# Patient Record
Sex: FEMALE | Race: WHITE
[De-identification: names, ages, dates, MRNs, and addresses within clinical notes are randomized per-mention and may not be internally consistent; named-entity substitution may affect disease eponyms.]

---

## 2021-02-10 ENCOUNTER — HOSPITAL ENCOUNTER (EMERGENCY)
Dept: HOSPITAL 56 - MW.ED | Age: 31
Discharge: HOME | End: 2021-02-10
Payer: SELF-PAY

## 2021-02-10 DIAGNOSIS — T83.84XA: Primary | ICD-10-CM

## 2021-02-10 NOTE — EDM.PDOC
ED HPI GENERAL MEDICAL PROBLEM





- General


Chief Complaint: OB/GYN Problem


Stated Complaint: IUD PAIN


Time Seen by Provider: 02/10/21 09:13





- History of Present Illness


INITIAL COMMENTS - FREE TEXT/NARRATIVE: 





CHIEF COMPLAINT(S): IUD pain








HISTORY OF PRESENT ILLNESS: This is a 30-year-old woman without any significant 

past medical history who comes to the emergency department with a chief 

complaint of IUD pain.  The patient states that she just moved from [place] and

for the last 2 weeks she has been experiencing pain from her IUD.  She states 

that she got the Mirena IUD placed 2 years ago.  She describes the pain as 

crampy and located in her groin area.  She denies any vaginal bleeding, vaginal 

discharge, back pain, dysuria, hematuria.  She denies any nausea, vomiting or 

injury.  She states that she is sexually active.  She states that she is not 

concerned about an STD.  She states that she did take Tylenol yesterday which 

did not relieve her pain.  She denies any aggravating symptoms of her pain.  She

describes her pain as 10 out of 10.  She denies any radiation of this pain.  She

states that she saw Dr. Porter (gyn) 1 week ago and is in the process of 

switching since she moved from [place].  She states that she came to the 

emergency department because she called the clinic and they told her that it 

would cost her $100 to have it removed.





 


REVIEW OF SYSTEMS: 





Constitutional: Denies fever, chills.


Eyes: Denies eye pain


Ears, Nose, Mouth, & Throat: Denies earache


Cardiovascular: Denies chest pain


Respiratory: Denies shortness of breath


Gastrointestinal: Denies abdominal pain, nausea, vomiting, diarrhea, 

hematochezia.


Genitourinary: Positive for IUD pain.  Denies hematuria, dysuria, vaginal 

bleeding, vaginal discharge, flank pain


Skin:Denies a rash


MSK: Denies joint pain


Neurological: Denies blurred vision numbness, tingling, weakness


Psychiatric: Denies depression








PAST MEDICAL HISTORY: As per history of present illness and as reviewed below 

otherwise noncontributory.





SURGICAL HISTORY: As per history of present illness and as reviewed below 

otherwise noncontributory.





LMP: Has not had periods since on Mirena





SOCIAL HISTORY: As per history of present illness and as reviewed below otherwi

se noncontributory.





FAMILY HISTORY: As per history of present illness and as reviewed below 

otherwise noncontributory.








EXAMINATION OF ORGAN SYSTEMS/BODY AREAS: 





Constitutional: Blood pressure was 141/93, heart rate 84, respiratory rate 15 

with an oxygen saturation 97% on room air.  Temperature 36.6


General: Overall well-appearing woman who is in no acute distress


Psychiatric: Appropriate mood and affect.


Eyes: No scleral icterus or conjunctival erythema


ENMT: Moist mucous membranes. No pharyngeal erythema


Cardiovascular: Regular, rate, and rhythm.  No gallops, murmurs, or rubs.


Respiratory: Lungs clear to auscultation bilaterally.  No wheezes, rales, or 

rhonchi.


Gastrointestinal: Soft, non-tender, non-distended.  Normoactive bowel sounds no 

rebound or guarding


Genitourinary: Mild suprapubic tenderness.  No CVA tenderness.  No obvious blood

on the patient's underwear.


Musculoskeletal: Normal range of motion.


Skin: No lesions or abrasions.


Neurological:     Alert, GCS 15








MEDICAL DECISION MAKING AND COURSE IN THE ED WITH INTERPRETATION/REVIEW OF 

DIAGNOSTIC STUDIES: This is a 30-year-old and without any significant past 

medical history who comes to the emergency department with IUD pain who has no 

other concerning symptoms such as vaginal bleeding, vaginal discharge, fever, 

chills, back pain, dysuria or hematuria.  Patient's vitals are normal.  I did 

have a discussion with the patient that typically IUD should be removed in the 

clinic so they can be monitored for post removal complications and is better 

done by a gynecologist.  I discussed with her that I would contact gynecology to

see if they could come and evaluate her in the emergency department versus 

having it removed in the clinic.  I did offer the patient Toradol IM.  She was 

amenable to this plan.  I did offer to send a urine sample for possible urinary 

tract infection however she states that she has had urinary tract infections in 

the past and this feels different.  Therefore we will not be sending a uri

nalysis today.





I contacted Dr. Porter who came down and had a discussion with the patient.  

During the discussion we discussed that we could treat her pain and that as soon

as her insurance kicks in she could have the IUD removed in the clinic setting 

where they have the appropriate tools and monitoring for complications as we do 

not typically remove IUDs in the emergency department.  At this time given that 

there is no other symptoms besides pain and she has normal vital signs and 

emergent IUD removal is not indicated at this time.  We did discuss with her 

that we could have her follow-up with [facility] where 

they could possibly provide her with a discounted or free IUD removal.





We again offered the patient Toradol for pain relief and we did discuss use of 

over-the-counter medications such as Tylenol Motrin.  She refused the 

intramuscular Toradol and just wanted the number and wanted to leave.  Therefore

I did provide the patient with a prescription for Tylenol and Motrin, contact 

information for obstetrics and gynecology and the contact number for the [facility].  She is to return for any new or worsening 

symptoms such as vaginal bleeding, fever, vaginal discharge.  She was amenable 

to discharge and had no further questions





DISPOSITION: The patient was discharged home in stable condition. The patient 

will follow up with obstetrics, gynecology, or [facility]





CONDITION: Fair





PROCEDURES: None





FINAL IMPRESSION(S)/DIAGNOSES: 





1.  Acute pain likely secondary to IUD





 





Fortunato Hsieh M.D.


  ** Pelvic


Pain Score (Numeric/FACES): 8





- Related Data


                                    Allergies











Allergy/AdvReac Type Severity Reaction Status Date / Time


 


No Known Allergies Allergy   Verified 02/10/21 08:57











Home Meds: 


                                    Home Meds





Acetaminophen [Tylenol Extra Strength] 1,000 mg PO Q6HR #28 tablet 02/10/21 [Rx]


Ibuprofen [Ibu] 400 mg PO Q6HR #28 tablet 02/10/21 [Rx]











Past Medical History





- Infectious Disease History


Infectious Disease History: Reports: None





Social & Family History





- Family History


Family Medical History: No Pertinent Family History





- Tobacco Use


Tobacco Use Status *Q: Never Tobacco User





- Caffeine Use


Caffeine Use: Reports: None





- Recreational Drug Use


Recreational Drug Use: No





ED ROS GENERAL





- Review of Systems


Review Of Systems: See Below





ED EXAM, RENAL/





- Physical Exam


Exam: See Below





Course





- Vital Signs


Last Recorded V/S: 


                                Last Vital Signs











Temp  36.6 C   02/10/21 08:58


 


Pulse  84   02/10/21 08:58


 


Resp  15   02/10/21 08:58


 


BP  141/93 H  02/10/21 08:58


 


Pulse Ox  97   02/10/21 08:58














- Orders/Labs/Meds


Meds: 


Medications














Discontinued Medications














Generic Name Dose Route Start Last Admin





  Trade Name Sofia  PRN Reason Stop Dose Admin


 


Ketorolac Tromethamine  15 mg  02/10/21 09:13  02/10/21 09:36





  Toradol  IM  02/10/21 09:14  Not Given





  ONETIME ONE  














Departure





- Departure


Time of Disposition: 09:32


Disposition: Home, Self-Care 01


Condition: Fair


Clinical Impression: 


Pain due to intrauterine contraceptive device (IUD)


Qualifiers:


 Encounter type: initial encounter Qualified Code(s): T83.84XA - Pain due to 

genitourinary prosthetic devices, implants and grafts, initial encounter








- Discharge Information


*PRESCRIPTION DRUG MONITORING PROGRAM REVIEWED*: No


*COPY OF PRESCRIPTION DRUG MONITORING REPORT IN PATIENT LAN: No


Prescriptions: 


Ibuprofen [Ibu] 400 mg PO Q6HR #28 tablet


Acetaminophen [Tylenol Extra Strength] 1,000 mg PO Q6HR #28 tablet


Instructions:  Intrauterine Device Information


Referrals: 


Aminata Root MD [Primary Care Provider] - 


Forms:  ED Department Discharge


Additional Instructions: 


You were evaluated today on an emergent basis.  At this time given no other 

symptoms other than pain in the setting emergent removal of IUD is not possible.

 I did have you evaluated by gynecologist/obstetrician Dr. Figueroa.  She did 

recommend pain treatment at this time and IUD removal in clinic where the 

appropriate tools and monitoring capabilities are available for IUD removal.  

Today we did offer you pain management which you had declined.  We do recommend 

using Tylenol 500 to 1000 mg every 6 hours and ibuprofen 400 mg every 6 hours 

for pain relief.  You can return to the obstetrician/gynecological clinic for 

removal in 2 weeks.  Please call their number.  In addition we will provide you 

with a number for [facility] (You will be provided with a paper) fo

r other options of removal. We do not know if they will remove it today but you 

may call and find out.  You are welcome to return to the emergency department if

you develop any vaginal bleeding, vaginal discharge, fever.





[facility]                                              

              


[address] [place] [id number]                                                             

                


Phone: [phone]


Fax: [phone]              





[facility]


[address] [place] [address]


Phone: [phone]


Fax: [phone]





The patient is informed of any results of their evaluation and diagnostic workup

and all questions are answered. They are given discharge instructions and return

precautions. The patient is stable for discharge.  The patient states they 

understand and agree with the plan and that they will return if their symptoms 

get worse or if they have any new concerns.





The following information is given to patients seen in the emergency department 

who are being discharged to home. This information is to outline your options 

for follow-up care. We provide all patients seen in our emergency department 

with a follow-up referral.





The need for follow-up, as well as the timing and circumstances, are variable 

depending upon the specifics of your emergency department visit.





If you don't have a primary care physician on staff, we will provide you with a 

referral. We always advise you to contact your personal physician following an 

emergency department visit to inform them of the circumstance of the visit and 

for follow-up with them and/or the need for any referrals to a consulting 

specialist.





The emergency department will also refer you to a specialist when appropriate. 

This referral assures that you have the opportunity for follow-up care with a 

specialist. All of these measure are taken in an effort to provide you with 

optimal care, which includes your follow-up.





Under all circumstances we always encourage you to contact your private 

physician who remains a resource for coordinating your care. When calling for 

follow-up care, please make the office aware that this follow-up is from your 

recent emergency room visit. If for any reason you are refused follow-up, please

contact the [facility] Emergency Department

at [phone] and asked to speak to the emergency department charge nurse.











Sepsis Event Note (ED)





- Evaluation


Sepsis Screening Result: No Definite Risk





- Focused Exam


Vital Signs: 


                                   Vital Signs











  Temp Pulse Resp BP Pulse Ox


 


 02/10/21 08:58  36.6 C  84  15  141/93 H  97